# Patient Record
Sex: FEMALE | Race: BLACK OR AFRICAN AMERICAN
[De-identification: names, ages, dates, MRNs, and addresses within clinical notes are randomized per-mention and may not be internally consistent; named-entity substitution may affect disease eponyms.]

---

## 2018-03-20 ENCOUNTER — HOSPITAL ENCOUNTER (INPATIENT)
Dept: HOSPITAL 5 - ED | Age: 63
LOS: 4 days | Discharge: HOME | DRG: 640 | End: 2018-03-24
Attending: INTERNAL MEDICINE | Admitting: INTERNAL MEDICINE
Payer: COMMERCIAL

## 2018-03-20 DIAGNOSIS — E11.9: ICD-10-CM

## 2018-03-20 DIAGNOSIS — I10: ICD-10-CM

## 2018-03-20 DIAGNOSIS — N17.0: ICD-10-CM

## 2018-03-20 DIAGNOSIS — E83.52: Primary | ICD-10-CM

## 2018-03-20 LAB
ALBUMIN SERPL-MCNC: 3.8 G/DL (ref 3.9–5)
ALT SERPL-CCNC: 14 UNITS/L (ref 7–56)
BASOPHILS # (AUTO): 0.1 K/MM3 (ref 0–0.1)
BASOPHILS NFR BLD AUTO: 0.5 % (ref 0–1.8)
BUN SERPL-MCNC: 22 MG/DL (ref 7–17)
BUN SERPL-MCNC: 23 MG/DL (ref 7–17)
BUN/CREAT SERPL: 10 %
BUN/CREAT SERPL: 10 %
CALCIUM SERPL-MCNC: > 13 MG/DL (ref 8.4–10.2)
EOSINOPHIL # BLD AUTO: 0.1 K/MM3 (ref 0–0.4)
EOSINOPHIL NFR BLD AUTO: 1.3 % (ref 0–4.3)
HCT VFR BLD CALC: 35.4 % (ref 30.3–42.9)
HEMOLYSIS INDEX: 21
HEMOLYSIS INDEX: 6
HGB BLD-MCNC: 11.9 GM/DL (ref 10.1–14.3)
LYMPHOCYTES # BLD AUTO: 3.7 K/MM3 (ref 1.2–5.4)
LYMPHOCYTES NFR BLD AUTO: 35.7 % (ref 13.4–35)
MCH RBC QN AUTO: 31 PG (ref 28–32)
MCHC RBC AUTO-ENTMCNC: 34 % (ref 30–34)
MCV RBC AUTO: 92 FL (ref 79–97)
MONOCYTES # (AUTO): 0.8 K/MM3 (ref 0–0.8)
MONOCYTES % (AUTO): 7.9 % (ref 0–7.3)
PLATELET # BLD: 315 K/MM3 (ref 140–440)
RBC # BLD AUTO: 3.83 M/MM3 (ref 3.65–5.03)

## 2018-03-20 PROCEDURE — 96360 HYDRATION IV INFUSION INIT: CPT

## 2018-03-20 PROCEDURE — 36415 COLL VENOUS BLD VENIPUNCTURE: CPT

## 2018-03-20 PROCEDURE — 82310 ASSAY OF CALCIUM: CPT

## 2018-03-20 PROCEDURE — 84166 PROTEIN E-PHORESIS/URINE/CSF: CPT

## 2018-03-20 PROCEDURE — 85027 COMPLETE CBC AUTOMATED: CPT

## 2018-03-20 PROCEDURE — 85025 COMPLETE CBC W/AUTO DIFF WBC: CPT

## 2018-03-20 PROCEDURE — 84165 PROTEIN E-PHORESIS SERUM: CPT

## 2018-03-20 PROCEDURE — 80048 BASIC METABOLIC PNL TOTAL CA: CPT

## 2018-03-20 PROCEDURE — 81001 URINALYSIS AUTO W/SCOPE: CPT

## 2018-03-20 PROCEDURE — 74176 CT ABD & PELVIS W/O CONTRAST: CPT

## 2018-03-20 PROCEDURE — 70450 CT HEAD/BRAIN W/O DYE: CPT

## 2018-03-20 PROCEDURE — 96361 HYDRATE IV INFUSION ADD-ON: CPT

## 2018-03-20 PROCEDURE — 93005 ELECTROCARDIOGRAM TRACING: CPT

## 2018-03-20 PROCEDURE — 82962 GLUCOSE BLOOD TEST: CPT

## 2018-03-20 PROCEDURE — 84484 ASSAY OF TROPONIN QUANT: CPT

## 2018-03-20 PROCEDURE — 80053 COMPREHEN METABOLIC PANEL: CPT

## 2018-03-20 PROCEDURE — 71046 X-RAY EXAM CHEST 2 VIEWS: CPT

## 2018-03-20 PROCEDURE — 93010 ELECTROCARDIOGRAM REPORT: CPT

## 2018-03-20 PROCEDURE — 83970 ASSAY OF PARATHORMONE: CPT

## 2018-03-20 PROCEDURE — A9500 TC99M SESTAMIBI: HCPCS

## 2018-03-20 PROCEDURE — 78070 PARATHYROID PLANAR IMAGING: CPT

## 2018-03-20 PROCEDURE — 77074 RADEX OSSEOUS SURVEY LMTD: CPT

## 2018-03-20 PROCEDURE — 83036 HEMOGLOBIN GLYCOSYLATED A1C: CPT

## 2018-03-20 NOTE — HISTORY AND PHYSICAL REPORT
History of Present Illness


Date of examination: 03/20/18


Date of admission: 


03/20/2018





Chief complaint: 


Chief complaint: Easy fatigability and weakness





History of present illness: 


 History of Present Illness:


62-year-old  female from [facility]--- comes in for feeling weak 

and tired.  Patient has recently moved to [place].  Patient apparently had a 

hairline fracture left shoulder and right lower extremity.  Patient is a poor 

historian.  Has records available from [place] but unable to interpret the 

records because of inadequate documentation and different medication names.


Patient does severe weakness and easy fatigability.  Patient was told that she 

has high calcium while in [facility] but no reason was given.











Past Medical History


Hx Hypertension: Yes


Hx Diabetes: Yes





Social History  


Smoking Status: Never Smoker


Substance Use Type: None





Surgical history


N/a





Family history


Diabetes











Medications and Allergies


 Allergies











Allergy/AdvReac Type Severity Reaction Status Date / Time


 


No Known Allergies Allergy   Verified 03/21/18 00:12











Active Meds: 


Active Medications





Sodium Chloride (Nacl 0.9% 1000 Ml)  1,000 mls @ 250 mls/hr IV ONCE ONE


   Stop: 03/21/18 03:02











Review of Systems


All systems: negative


Constitutional: fatigue, weakness, chronic headaches, poor appetite, no weight 

loss, no weight gain, no fever, no chills, no sweats, no night sweats


Ears, nose, mouth and throat: no hoarseness, no sore throat, no swelling in 

mouth, no swelling in throat


Breasts: deferred


Cardiovascular: no chest pain, no orthopnea, no palpitations, no rapid/

irregular heart beat, no edema, no syncope, no lightheadedness, no shortness of 

breath


Respiratory: no cough, no cough with sputum, no excessive sputum, no hemoptysis

, no shortness of breath, no dyspnea on exertion


Gastrointestinal: nausea, no abdominal pain, no vomiting, no diarrhea, no 

constipation, no change in bowel habits, no hematemesis, no coffee ground emesis


Genitourinary Female: no dysuria, no urinary frequency, no urgency, no stress 

incontinence


Menstruation: ammenorrhea


Rectal: no pain


Musculoskeletal: low back pain, no neck stiffness, no neck pain, no shooting 

arm pain, no arm numbness/tingling, no shooting leg pain, no leg numbness/

tingling, no redness of joints


Integumentary: no rash, no pruritis, no redness, no sores, no wounds, no 

jaundice, no boils, no blisters


Neurological: no head injury, no transient paralysis, no paralysis, no seizures

, no syncope


Psychiatric: no anxiety, no memory loss, no change in sleep habits, no sleep 

disturbances


Endocrine: no cold intolerance, no heat intolerance, no polyphagia, no 

excessive thirst


Hematologic/Lymphatic: no easy bruising, no easy bleeding


Allergic/Immunologic: no urticaria, no allergic rhinitis, no wheezing





Exam





- Physical Exam


Narrative exam: 


Lying in bed comfortably








- Constitutional


Vitals: 


 











Temp Pulse Resp BP Pulse Ox


 


 98.4 F   89   14   142/76   98 


 


 03/20/18 22:59  03/20/18 22:59  03/20/18 22:59  03/20/18 22:59  03/20/18 22:59











General appearance: Present: no acute distress





- EENT


Eyes: Present: PERRL, EOM intact


ENT: hearing intact, clear oral mucosa





- Neck


Neck: Present: supple, normal ROM





- Respiratory


Respiratory effort: normal


Respiratory: bilateral: CTA





- Cardiovascular


Rhythm: regular (70)


Heart Sounds: Present: S1 & S2.  Absent: rub, click





- Extremities


Extremities: no ischemia, pulses intact, pulses symmetrical, No edema


Peripheral Pulses: within normal limits





- Abdominal


General gastrointestinal: Present: soft, non-tender, non-distended, normal 

bowel sounds


Female genitourinary: Present: normal





- Rectal


Rectal Exam: deferred





- Integumentary


Integumentary: Present: clear, warm, dry





- Musculoskeletal


Musculoskeletal: gait normal, strength equal bilaterally





- Psychiatric


Psychiatric: appropriate mood/affect, intact judgment & insight, cooperative





- Neurologic


Neurologic: CNII-XII intact, moves all extremities





- Allied Health


Allied health notes reviewed: nursing, case management





Results





- Labs


CBC & Chem 7: 


 03/20/18 20:03





 03/20/18 22:58


Labs: 


 Laboratory Last Values











WBC  10.3 K/mm3 (4.5-11.0)   03/20/18  20:03    


 


RBC  3.83 M/mm3 (3.65-5.03)   03/20/18  20:03    


 


Hgb  11.9 gm/dl (10.1-14.3)   03/20/18  20:03    


 


Hct  35.4 % (30.3-42.9)   03/20/18  20:03    


 


MCV  92 fl (79-97)   03/20/18  20:03    


 


MCH  31 pg (28-32)   03/20/18  20:03    


 


MCHC  34 % (30-34)   03/20/18  20:03    


 


RDW  12.4 % (13.2-15.2)  L  03/20/18  20:03    


 


Plt Count  315 K/mm3 (140-440)   03/20/18  20:03    


 


Lymph % (Auto)  35.7 % (13.4-35.0)  H  03/20/18  20:03    


 


Mono % (Auto)  7.9 % (0.0-7.3)  H  03/20/18  20:03    


 


Eos % (Auto)  1.3 % (0.0-4.3)   03/20/18  20:03    


 


Baso % (Auto)  0.5 % (0.0-1.8)   03/20/18  20:03    


 


Lymph #  3.7 K/mm3 (1.2-5.4)   03/20/18  20:03    


 


Mono #  0.8 K/mm3 (0.0-0.8)   03/20/18  20:03    


 


Eos #  0.1 K/mm3 (0.0-0.4)   03/20/18  20:03    


 


Baso #  0.1 K/mm3 (0.0-0.1)   03/20/18  20:03    


 


Seg Neutrophils %  54.6 % (40.0-70.0)   03/20/18  20:03    


 


Seg Neutrophils #  5.6 K/mm3 (1.8-7.7)   03/20/18  20:03    


 


Sodium  132 mmol/L (137-145)  L  03/20/18  20:03    


 


Potassium  4.3 mmol/L (3.6-5.0)   03/20/18  20:03    


 


Chloride  93.6 mmol/L ()  L  03/20/18  20:03    


 


Carbon Dioxide  27 mmol/L (22-30)   03/20/18  20:03    


 


Anion Gap  16 mmol/L  03/20/18  20:03    


 


BUN  22 mg/dL (7-17)  H  03/20/18  20:03    


 


Creatinine  2.2 mg/dL (0.7-1.2)  H  03/20/18  20:03    


 


Estimated GFR  23 ml/min  03/20/18  20:03    


 


BUN/Creatinine Ratio  10 %  03/20/18  20:03    


 


Glucose  144 mg/dL ()  H  03/20/18  20:03    


 


Calcium  > 13.0 mg/dL (8.4-10.2)  H*  03/20/18  20:03    


 


Troponin T  0.024 ng/mL (0.00-0.029)   03/20/18  22:10    








 Short CBC











  03/20/18 Range/Units





  20:03 


 


WBC  10.3  (4.5-11.0)  K/mm3


 


Hgb  11.9  (10.1-14.3)  gm/dl


 


Hct  35.4  (30.3-42.9)  %


 


Plt Count  315  (140-440)  K/mm3








 BMP











  03/20/18 03/20/18





  20:03 22:58


 


Sodium  132 L  134 L


 


Potassium  4.3  4.0


 


Chloride  93.6 L  96.7 L


 


Carbon Dioxide  27  22


 


BUN  22 H  23 H


 


Creatinine  2.2 H  2.3 H


 


Glucose  144 H  106 H


 


Calcium  > 13.0 H*  > 13.0 H*








 Cardiac Enzymes











  03/20/18 Range/Units





  22:10 


 


Troponin T  0.024  (0.00-0.029)  ng/mL








 Liver Function











  03/20/18 Range/Units





  22:58 


 


Total Bilirubin  0.20  (0.1-1.2)  mg/dL


 


AST  18  (5-40)  units/L


 


ALT  14  (7-56)  units/L


 


Alkaline Phosphatase  39  ()  units/L


 


Albumin  3.8 L  (3.9-5)  g/dL








 Urine











  03/21/18 Range/Units





  01:14 


 


Urine Color  Straw  (Yellow)  


 


Urine pH  6.0  (5.0-7.0)  


 


Ur Specific Gravity  1.004  (1.003-1.030)  


 


Urine Protein  <15 mg/dl  (Negative)  mg/dL


 


Urine Glucose (UA)  Neg  (Negative)  mg/dL














Assessment and Plan


Advance Directives: Yes (full code)


VTE prophylaxis?: Chemical


Plan of care discussed with patient/family: Yes





- Patient Problems


(1) JOVANA (acute kidney injury)


Current Visit: Yes   Status: Acute   


Plan to address problem: 


Probably secondary to hypercalcemia.  IV fluids for now.








(2) Hypercalcemia


Current Visit: Yes   Status: Acute   


Plan to address problem: 


Differential diagnoses of hyperparathyroidism multiple myeloma and bony 

metastasis.  PTH ordered ,parathyroid scan ordered.  CMP ordered for albumin 

and globulin ratio.  IV fluids for now.  Calcitonin started








(3) Hypertension


Current Visit: Yes   Status: Chronic   


Qualifiers: 


   Hypertension type: essential hypertension   Qualified Code(s): I10 - 

Essential (primary) hypertension   


Plan to address problem: 


Initiated on amlodipine








(4) Type 2 diabetes mellitus


Current Visit: Yes   Status: Chronic   


Qualifiers: 


   Diabetes mellitus long term insulin use: without long term use 


Plan to address problem: 


Oral hypoglycemics and coverage for now.  Check hemoglobin A1c








(5) DVT prophylaxis


Current Visit: Yes   Status: Acute   


Plan to address problem: 


Heparin 5000 every 12

## 2018-03-20 NOTE — EMERGENCY DEPARTMENT REPORT
ED General Adult HPI





- General


Chief complaint: Weakness


Stated complaint: WEAKNESS


Time Seen by Provider: 18 21:54


Source: patient


Mode of arrival: Ambulatory


Limitations: Language Barrier





- History of Present Illness


Initial comments: 





Patient is 62 years old female recently moved from [place] for a visit to her 

family here.  Patient had history of diabetes and hypertension.  Family brought 

the patient because the patient is complaining of generalized weakness and 

fatigue loss of appetite and also been coughing and complaining of headache for 

the last 4 days.  Family said that patient does not have any appetite.  No 

fever no vomiting no diarrhea.  No urinary symptoms.





- Related Data


 Allergies











Allergy/AdvReac Type Severity Reaction Status Date / Time


 


No Known Allergies Allergy   Unverified 18 18:39














ED Review of Systems


ROS: 


Stated complaint: WEAKNESS


Other details as noted in HPI





Comment: All other systems reviewed and negative


Constitutional: denies: chills, fever


ENT: denies: throat pain


Respiratory: cough.  denies: orthopnea, shortness of breath, SOB with exertion, 

SOB at rest


Cardiovascular: denies: chest pain, palpitations, dyspnea on exertion


Gastrointestinal: nausea, constipation.  denies: abdominal pain, vomiting, 

diarrhea, hematemesis, melena, hematochezia


Neurological: headache, weakness.  denies: numbness, paresthesias, confusion





ED Past Medical Hx





- Past Medical History


Hx Hypertension: Yes


Hx Diabetes: Yes





- Social History


Smoking Status: Never Smoker


Substance Use Type: None





ED Physical Exam





- General


Limitations: Language Barrier


General appearance: alert, in no apparent distress





- Head


Head exam: Present: atraumatic, normocephalic





- Eye


Eye exam: Present: normal appearance, PERRL





- ENT


ENT exam: Present: mucous membranes dry





- Neck


Neck exam: Present: normal inspection, full ROM.  Absent: tenderness, 

meningismus, lymphadenopathy, thyromegaly





- Respiratory


Respiratory exam: Present: normal lung sounds bilaterally.  Absent: respiratory 

distress, wheezes, rales, rhonchi, chest wall tenderness, accessory muscle use, 

decreased breath sounds, prolonged expiratory





- Cardiovascular


Cardiovascular Exam: Present: regular rate, normal rhythm, normal heart sounds





- GI/Abdominal


GI/Abdominal exam: Present: soft, normal bowel sounds.  Absent: distended, 

tenderness, guarding, rebound, rigid, organomegaly, mass, bruit, pulsatile mass

, hernia





- Extremities Exam


Extremities exam: Present: normal inspection, full ROM, normal capillary refill





- Back Exam


Back exam: Present: normal inspection, full ROM.  Absent: tenderness, CVA 

tenderness (R), CVA tenderness (L)





- Neurological Exam


Neurological exam: Present: alert, oriented X3, CN II-XII intact, normal gait





- Skin


Skin exam: Present: warm, dry, intact





ED Course


 Vital Signs











  18





  18:33 21:54 22:00


 


Temperature 98.6 F  


 


Pulse Rate 78  


 


Respiratory 16  





Rate   


 


Blood Pressure 157/91  153/81


 


Blood Pressure   





[Left]   


 


O2 Sat by Pulse 100 99 97





Oximetry   














  18





  22:11 22:21 22:51


 


Temperature   


 


Pulse Rate   


 


Respiratory   





Rate   


 


Blood Pressure 153/81 153/81 153/81


 


Blood Pressure   





[Left]   


 


O2 Sat by Pulse 98 97 97





Oximetry   














  18





  22:57 22:59


 


Temperature  98.4 F


 


Pulse Rate  89


 


Respiratory 14 14





Rate  


 


Blood Pressure  


 


Blood Pressure  142/76





[Left]  


 


O2 Sat by Pulse 98 98





Oximetry  














ED Medical Decision Making





- Lab Data


Result diagrams: 


 18 20:03





 18 20:03





- Radiology Data


Radiology results: report reviewed





Referring Physician:   ANABELLE PINO


Patient Name:   SERENA MABRY


Patient ID:   [id number]


YOB: 1955


Sex:   Female


Accession:   [id number]


Report Date:   2018


Report Status:   Finalized


Findings


[place] [address] 





Cat Scan Report 


Signed 





Patient: SERENA MABRY MR#: [id number] 


: 1955 Acct:[id number] 


Age/Sex: 62 / F ADM Date: 18 


Loc: ED 


Attending Dr: 








Ordering Physician: ANABELLE PINO 


Date of Service: 18 


Procedure(s): CT head/brain wo con 


Accession Number(s): [id number] 





cc: ANABELLE PINO 








FINAL REPORT 





EXAM: CT HEAD/BRAIN WO CON 





HISTORY: headache 





TECHNIQUE: CT head without contrast 





PRIORS: None. 





FINDINGS: 


No acute intra-axial or extra-axial hemorrhage is identified. 


There is no evidence of midline shift or mass effect. The 


ventricles and sulci are within normal limits. Gray-white matter 


differentiation is intact. No acute parenchymal abnormalities 


seen. 





Bony calvarium is grossly intact. Visualized portions of the 


mastoids and paranasal sinuses are unremarkable. 





IMPRESSION: 


Negative CT head 











Transcribed By: DEJAH 


Dictated By: RADHA CHAPIN MD 


Electronically Authenticated By: RADHA CHAPIN MD 


Signed Date/Time: 18 











DD/DT: 18 


TD/TT: 18





- Medical Decision Making





I discussed the patient is Dr. Verduzco, he agreed to admit the patient to service.


Critical care attestation.: 


If time is entered above; I have spent that time in minutes in the direct care 

of this critically ill patient, excluding procedure time.








ED Disposition


Clinical Impression: 


 Acute renal failure, Hypercalcemia, Headache





Disposition:  OP ADMIT IP TO THIS HOSP


Is pt being admited?: Yes


Condition: Stable


Referrals: 


PRIMARY CARE,MD [Primary Care Provider] - 3-5 Days

## 2018-03-20 NOTE — CAT SCAN REPORT
FINAL REPORT



EXAM:  CT HEAD/BRAIN WO CON



HISTORY:  headache 



TECHNIQUE:  CT head without contrast 



PRIORS:  None.



FINDINGS:  

No acute intra-axial or extra-axial hemorrhage is identified. 

There is no evidence of midline shift or mass effect.  The

ventricles and sulci are within normal limits. Gray-white matter

differentiation is intact. No acute parenchymal abnormalities

seen. 



Bony calvarium is grossly intact.  Visualized portions of the

mastoids and paranasal sinuses are unremarkable. 



IMPRESSION:  

Negative CT head

## 2018-03-21 LAB
BASOPHILS # (AUTO): 0 K/MM3 (ref 0–0.1)
BASOPHILS NFR BLD AUTO: 0.3 % (ref 0–1.8)
BILIRUB UR QL STRIP: (no result)
BLOOD UR QL VISUAL: (no result)
BUN SERPL-MCNC: 23 MG/DL (ref 7–17)
BUN/CREAT SERPL: 10 %
CALCIUM SERPL-MCNC: > 13 MG/DL (ref 8.4–10.2)
CALCIUM SERPL-MCNC: > 13 MG/DL (ref 8.4–10.2)
EOSINOPHIL # BLD AUTO: 0.2 K/MM3 (ref 0–0.4)
EOSINOPHIL NFR BLD AUTO: 1.8 % (ref 0–4.3)
HCT VFR BLD CALC: 32.7 % (ref 30.3–42.9)
HEMOLYSIS INDEX: 3
HGB BLD-MCNC: 11.2 GM/DL (ref 10.1–14.3)
LYMPHOCYTES # BLD AUTO: 3.6 K/MM3 (ref 1.2–5.4)
LYMPHOCYTES NFR BLD AUTO: 39.6 % (ref 13.4–35)
MCH RBC QN AUTO: 31 PG (ref 28–32)
MCHC RBC AUTO-ENTMCNC: 34 % (ref 30–34)
MCV RBC AUTO: 91 FL (ref 79–97)
MONOCYTES # (AUTO): 0.8 K/MM3 (ref 0–0.8)
MONOCYTES % (AUTO): 8.6 % (ref 0–7.3)
PH UR STRIP: 6 [PH] (ref 5–7)
PLATELET # BLD: 276 K/MM3 (ref 140–440)
PROT UR STRIP-MCNC: (no result) MG/DL
RBC # BLD AUTO: 3.61 M/MM3 (ref 3.65–5.03)
RBC #/AREA URNS HPF: 1 /HPF (ref 0–6)
UROBILINOGEN UR-MCNC: < 2 MG/DL (ref ?–2)
WBC #/AREA URNS HPF: 1 /HPF (ref 0–6)

## 2018-03-21 RX ADMIN — Medication SCH ML: at 12:22

## 2018-03-21 RX ADMIN — AMLODIPINE BESYLATE SCH MG: 10 TABLET ORAL at 12:10

## 2018-03-21 RX ADMIN — Medication PRN ML: at 12:21

## 2018-03-21 RX ADMIN — SODIUM CHLORIDE SCH MLS/HR: 0.9 INJECTION, SOLUTION INTRAVENOUS at 04:05

## 2018-03-21 RX ADMIN — CALCITONIN SALMON SCH UNIT: 200 INJECTION, SOLUTION INTRAMUSCULAR; SUBCUTANEOUS at 21:54

## 2018-03-21 RX ADMIN — HEPARIN SODIUM SCH UNIT: 5000 INJECTION, SOLUTION INTRAVENOUS; SUBCUTANEOUS at 06:32

## 2018-03-21 RX ADMIN — HEPARIN SODIUM SCH UNIT: 5000 INJECTION, SOLUTION INTRAVENOUS; SUBCUTANEOUS at 01:52

## 2018-03-21 RX ADMIN — Medication SCH ML: at 22:00

## 2018-03-21 RX ADMIN — ONDANSETRON PRN MG: 2 INJECTION INTRAMUSCULAR; INTRAVENOUS at 12:47

## 2018-03-21 RX ADMIN — HEPARIN SODIUM SCH UNIT: 5000 INJECTION, SOLUTION INTRAVENOUS; SUBCUTANEOUS at 21:59

## 2018-03-21 RX ADMIN — HEPARIN SODIUM SCH UNIT: 5000 INJECTION, SOLUTION INTRAVENOUS; SUBCUTANEOUS at 13:54

## 2018-03-21 NOTE — PROGRESS NOTE
Assessment and Plan


Assessment and plan: 


62-year-old  female from [facility]-comes in for feeling weak and tired.  

Patient has recently moved to [place].  Patient apparently had a hairline 

fracture left shoulder and right lower extremity.  Patient is a poor historian.

  Has records available from [place] but unable to interpret the records because 

of inadequate documentation and different medication names.


Patient does severe weakness and easy fatigability.  Patient was told that she 

has high calcium while in [place] but no reason was given.





- Patient Problems








(1) JOVANA (acute kidney injury)


Current Visit: Yes   Status: Acute   


Plan to address problem: 


Probably secondary to hypercalcemia.  IV fluids for now.


Nephrology consultation








(2) Hypercalcemia


Current Visit: Yes   Status: Acute   


Plan to address problem: 


Differential diagnoses of hyperparathyroidism multiple myeloma and bony 

metastasis.  PTH ordered ,parathyroid scan ordered.  CMP ordered for albumin 

and globulin ratio.  IV fluids for now.  Calcitonin started


We'll also obtain a skeletal survey for metastatic disease considering recent 

fractures.








(3) Hypertension


Current Visit: Yes   Status: Chronic   


Qualifiers: 


   Hypertension type: essential hypertension   Qualified Code(s): I10 - 

Essential (primary) hypertension   


Plan to address problem: 


Initiated on amlodipine








(4) Type 2 diabetes mellitus


Current Visit: Yes   Status: Chronic   


Qualifiers: 


   Diabetes mellitus long term insulin use: without long term use 


Plan to address problem: 


Oral hypoglycemics and coverage for now.  Check hemoglobin A1c








(5) DVT prophylaxis


Current Visit: Yes   Status: Acute   


Plan to address problem: 


Heparin 5000 every 12


Case discussed with nephrologist and also with patient's family member.


Awaiting medication reconciliation discussed with nursing staff





History


Interval history: 


Patient is seen today for: Hypercalcemia





Seen and examined at bedside; 24hour events reviewed; nursing staff ; no 

adverse overnight events reported to me; Denies any chest pain, nausea, vomiting

, diarrhea


No fever noted blood pressure controlled











Hospitalist Physical





- Physical exam


Narrative exam: 


 VITAL SIGNS:  Reviewed.    


GENERAL:  The patient appeared well nourished and normally developed. Vital 

signs as documented.


HEAD:  No signs of head trauma.


EYES:  Pupils are equal.  Extraocular motions intact.  


EARS:  Hearing grossly intact.


MOUTH:  Oropharynx is normal. 


NECK:  No adenopathy, no JVD.   


CHEST:  Chest with clear breath sounds bilaterally.  No wheezes, rales, or 

rhonchi.  


CARDIAC:  Regular rate and rhythm.  S1 and S2, without murmurs, gallops, or 

rubs.


VASCULAR:  No Edema.  Peripheral pulses normal and equal in all extremities.


ABDOMEN:  Soft, without detectable tenderness.  No sign of distention.  No   

rebound or guarding, and no masses palpated.   Bowel Sounds normal.


MUSCULOSKELETAL:  Good range of motion of all major joints. Extremities without 

clubbing, cyanosis or edema.  


NEUROLOGIC EXAM:  Alert and oriented x 3.  No focal sensory or strength 

deficits.   Speech normal.  Follows commands.


PSYCHIATRIC:  Mood normal.


SKIN:  No rash or lesions.














- Constitutional


Vitals: 


 











Temp Pulse Resp BP Pulse Ox


 


 98.4 F   95 H  16   132/73   96 


 


 03/21/18 20:05  03/21/18 20:05  03/21/18 20:05  03/21/18 20:05  03/21/18 20:05











General appearance: Present: no acute distress





Results





- Labs


CBC & Chem 7: 


 03/22/18 05:32





 03/22/18 05:32


Labs: 


 Laboratory Last Values











WBC  9.1 K/mm3 (4.5-11.0)   03/21/18  06:06    


 


RBC  3.61 M/mm3 (3.65-5.03)  L  03/21/18  06:06    


 


Hgb  11.2 gm/dl (10.1-14.3)   03/21/18  06:06    


 


Hct  32.7 % (30.3-42.9)   03/21/18  06:06    


 


MCV  91 fl (79-97)   03/21/18  06:06    


 


MCH  31 pg (28-32)   03/21/18  06:06    


 


MCHC  34 % (30-34)   03/21/18  06:06    


 


RDW  12.4 % (13.2-15.2)  L  03/21/18  06:06    


 


Plt Count  276 K/mm3 (140-440)   03/21/18  06:06    


 


Lymph % (Auto)  39.6 % (13.4-35.0)  H  03/21/18  06:06    


 


Mono % (Auto)  8.6 % (0.0-7.3)  H  03/21/18  06:06    


 


Eos % (Auto)  1.8 % (0.0-4.3)   03/21/18  06:06    


 


Baso % (Auto)  0.3 % (0.0-1.8)   03/21/18  06:06    


 


Lymph #  3.6 K/mm3 (1.2-5.4)   03/21/18  06:06    


 


Mono #  0.8 K/mm3 (0.0-0.8)   03/21/18  06:06    


 


Eos #  0.2 K/mm3 (0.0-0.4)   03/21/18  06:06    


 


Baso #  0.0 K/mm3 (0.0-0.1)   03/21/18  06:06    


 


Seg Neutrophils %  49.7 % (40.0-70.0)   03/21/18  06:06    


 


Seg Neutrophils #  4.5 K/mm3 (1.8-7.7)   03/21/18  06:06    


 


Sodium  138 mmol/L (137-145)   03/21/18  06:06    


 


Potassium  3.8 mmol/L (3.6-5.0)   03/21/18  06:06    


 


Chloride  100.1 mmol/L ()   03/21/18  06:06    


 


Carbon Dioxide  26 mmol/L (22-30)   03/21/18  06:06    


 


Anion Gap  16 mmol/L  03/21/18  06:06    


 


BUN  23 mg/dL (7-17)  H  03/21/18  06:06    


 


Creatinine  2.3 mg/dL (0.7-1.2)  H  03/21/18  06:06    


 


Estimated GFR  22 ml/min  03/21/18  06:06    


 


BUN/Creatinine Ratio  10 %  03/21/18  06:06    


 


Glucose  56 mg/dL ()  L  03/21/18  06:06    


 


POC Glucose  174  ()  H  03/21/18  21:55    


 


Hemoglobin A1c  8.1 % (4-6)  H  03/21/18  00:27    


 


Calcium  > 13.0 mg/dL (8.4-10.2)  H*  03/21/18  06:06    


 


Total Bilirubin  0.20 mg/dL (0.1-1.2)   03/20/18  22:58    


 


AST  18 units/L (5-40)   03/20/18  22:58    


 


ALT  14 units/L (7-56)   03/20/18  22:58    


 


Alkaline Phosphatase  39 units/L ()   03/20/18  22:58    


 


Troponin T  0.024 ng/mL (0.00-0.029)   03/20/18  22:10    


 


Total Protein  6.9 g/dL (6.3-8.2)   03/20/18  22:58    


 


Albumin  3.8 g/dL (3.9-5)  L  03/20/18  22:58    


 


Albumin/Globulin Ratio  1.2 %  03/20/18  22:58    


 


PTH Intact  10.97 pg/mL (15-65)  L  03/21/18  00:27    


 


Urine Color  Straw  (Yellow)   03/21/18  01:14    


 


Urine Turbidity  Clear  (Clear)   03/21/18  01:14    


 


Urine pH  6.0  (5.0-7.0)   03/21/18  01:14    


 


Ur Specific Gravity  1.004  (1.003-1.030)   03/21/18  01:14    


 


Urine Protein  <15 mg/dl mg/dL (Negative)   03/21/18  01:14    


 


Urine Glucose (UA)  Neg mg/dL (Negative)   03/21/18  01:14    


 


Urine Ketones  Neg mg/dL (Negative)   03/21/18  01:14    


 


Urine Blood  Neg  (Negative)   03/21/18  01:14    


 


Urine Nitrite  Neg  (Negative)   03/21/18  01:14    


 


Urine Bilirubin  Neg  (Negative)   03/21/18  01:14    


 


Urine Urobilinogen  < 2.0 mg/dL (<2.0)   03/21/18  01:14    


 


Ur Leukocyte Esterase  Neg  (Negative)   03/21/18  01:14    


 


Urine WBC (Auto)  1.0 /HPF (0.0-6.0)   03/21/18  01:14    


 


Urine RBC (Auto)  1.0 /HPF (0.0-6.0)   03/21/18  01:14

## 2018-03-21 NOTE — NUCLEAR MEDICINE REPORT
NUCLEAR MEDICINE PARATHYROID SCAN:



History: Hypercalcemia.



FINDINGS:

The initial scintigraphic images of the thyroid bed demonstrate

normal and symmetric uptake of the radiotracer in the thyroid bed. 

Normal salivary and mediastinal activity is identified.



The delayed images demonstrate normal washout of the radiotracer

from the thyroid bed.  No persistent activity is identified to suggest 

a parathyroid adenoma.



IMPRESSION:

Normal parathyroid scan.

## 2018-03-21 NOTE — CONSULTATION
History of Present Illness





- Reason for Consult


Consult date: 03/21/18


acute renal failure


Requesting physician: RAMÓN DEE





- History of Present Illness


This is a 62 years old [place]ag[place] woman, recently moved from [place] for a visit to 

her family here, with past medical history of hypertension, type 2 DM, and 

recent hospitalization in [place] due to fracture of left shoulder and R lower 

extremity, now brought in to ER by family with complaints of generalized 

weakness and fatigue loss of appetite and also been coughing and complaining of 

headache for the last 4 days. pt denies fever, chills, CP, SOB, palpitations, 

abd pain, blurry vision, dysuria. Labs showed significantly increased Ca at >13 

along with elevated BUN/Cr at 23/2.3mg/dl for which renal consult is requested. 

pt brings in records from [place], Cr was 1.1mg/dl in 2/11/18. no known 

malignancy noted. pt's daughter reports that she has been taking calcium 

supplementations after her fracture was diagnosed. 





Past History


Past Medical History: diabetes, hypertension


Past Surgical History: No surgical history


Social history: lives with family.  denies: smoking, alcohol abuse, 

prescription drug abuse, IV drug use


Family history: hypertension





Medications and Allergies


 Allergies











Allergy/AdvReac Type Severity Reaction Status Date / Time


 


No Known Allergies Allergy   Verified 03/21/18 00:12











Active Meds: 


Active Medications





Acetaminophen (Tylenol)  650 mg PO Q4H PRN


   PRN Reason: Pain MILD(1-3)/Fever >100.5/HA


Amlodipine Besylate (Norvasc)  10 mg PO QDAY [facility]


Calcitonin Burnett (Miacalcin)  250 unit 4 unit/kg (250 unit) IM Q12HR [facility]


   Stop: 03/23/18 10:01


Famotidine (Pepcid)  20 mg IV DAILY [facility]


Heparin Sodium (Porcine) (Heparin)  5,000 unit SUB-Q Q8HR [facility]


   Last Admin: 03/21/18 06:32 Dose:  5,000 unit


Hydromorphone HCl (Dilaudid)  0.5 mg IV Q3H PRN


   PRN Reason: Pain , Severe (7-10)


Sodium Chloride (Nacl 0.9% 1000 Ml)  1,000 mls @ 100 mls/hr IV AS DIRECT [facility]


   Last Admin: 03/21/18 04:05 Dose:  100 mls/hr


Pamidronate Disodium 60 mg/ (Sodium Chloride)  1,006.6667 mls @ 100 mls/hr IV 

ONCE ONE


   Stop: 03/21/18 17:03


Insulin Human Lispro (Humalog)  0 unit SUB-Q ACHS [facility]; Protocol


   Last Admin: 03/21/18 08:22 Dose:  Not Given


Morphine Sulfate (Morphine)  2 mg IV Q4H PRN


   PRN Reason: Pain, Moderate (4-6)


Ondansetron HCl (Zofran)  4 mg IV Q8H PRN


   PRN Reason: Nausea And Vomiting


Oxycodone/Acetaminophen (Percocet 5/325)  1 tab PO Q6H PRN


   PRN Reason: Pain, Moderate (4-6)


Sodium Chloride (Sodium Chloride Flush Syringe 10 Ml)  10 ml IV BID [facility]


Sodium Chloride (Sodium Chloride Flush Syringe 10 Ml)  10 ml IV PRN PRN


   PRN Reason: LINE FLUSH











Review of Systems


All systems: negative


Constitutional: anorexia, fatigue, weakness, lethargy





Exam





- Vital Signs


Vital signs: 


 Vital Signs











Temp Pulse Resp BP Pulse Ox


 


 98.6 F   78   16   157/91   100 


 


 03/20/18 18:33  03/20/18 18:33  03/20/18 18:33  03/20/18 18:33  03/20/18 18:33














- General Appearance


General appearance: well-developed, well-nourished, appears stated age


EENT: ATNC, PERRL, mucous membranes moist


Neck: Present: neck supple


Respiratory: Clear to Ascultation


Heart: regular, S1S2


Gastrointestinal: Present: normoactive bowel sounds


Integumentary: no rash, other (no edema )


Neurologic: no focal deficit, alert and oriented x3, strength 5/5, CN 3-12 

intact


Psychiatric: mood/affect appropriate, cooperative





Results





- Lab Results





 03/21/18 06:06





 03/21/18 06:06


 Most recent lab results











Calcium  > 13.0 mg/dL (8.4-10.2)  H*  03/21/18  06:06    














Assessment and Plan





- Patient Problems


(1) Hypercalcemia


Current Visit: Yes   Status: Acute   


Plan to address problem: 


hypercalcemia in the setting of increased calcium supplementation, 

dehydaration. PTH adequately suppressed. to rule out underlying malignancy. 

check SPEP w/ SAEID/ UPEP, check  PTHrP. cont IV NS, start pamidronate (slow 

infusion in 2-3hrs given Jovana), will add calcitonin 4mcg/kg q12hr for 4 doses. 








(2) JOVANA (acute kidney injury)


Current Visit: Yes   Status: Acute   


Plan to address problem: 


due to acute hypercalcemia. treatment as above. 








(3) Hypertension


Current Visit: Yes   Status: Chronic   


Qualifiers: 


   Hypertension type: essential hypertension   Qualified Code(s): I10 - 

Essential (primary) hypertension   


Plan to address problem: 


Bp controlled 








(4) Type 2 diabetes mellitus


Current Visit: Yes   Status: Chronic   


Qualifiers: 


   Diabetes mellitus long term insulin use: without long term use 


Plan to address problem: 


glucose control as per primary attending

## 2018-03-22 LAB
ALBUMIN SERPL-MCNC: 3.7 G/DL (ref 3.9–5)
ALT SERPL-CCNC: 13 UNITS/L (ref 7–56)
BUN SERPL-MCNC: 22 MG/DL (ref 7–17)
BUN/CREAT SERPL: 11 %
CALCIUM SERPL-MCNC: 12.5 MG/DL (ref 8.4–10.2)
HCT VFR BLD CALC: 29.8 % (ref 30.3–42.9)
HEMOLYSIS INDEX: 3
HGB BLD-MCNC: 10.4 GM/DL (ref 10.1–14.3)
MCH RBC QN AUTO: 32 PG (ref 28–32)
MCHC RBC AUTO-ENTMCNC: 35 % (ref 30–34)
MCV RBC AUTO: 90 FL (ref 79–97)
PLATELET # BLD: 270 K/MM3 (ref 140–440)
RBC # BLD AUTO: 3.3 M/MM3 (ref 3.65–5.03)

## 2018-03-22 RX ADMIN — Medication SCH ML: at 21:42

## 2018-03-22 RX ADMIN — SODIUM CHLORIDE SCH MLS/HR: 0.9 INJECTION, SOLUTION INTRAVENOUS at 13:35

## 2018-03-22 RX ADMIN — HEPARIN SODIUM SCH UNIT: 5000 INJECTION, SOLUTION INTRAVENOUS; SUBCUTANEOUS at 21:41

## 2018-03-22 RX ADMIN — HEPARIN SODIUM SCH UNIT: 5000 INJECTION, SOLUTION INTRAVENOUS; SUBCUTANEOUS at 13:17

## 2018-03-22 RX ADMIN — FAMOTIDINE SCH MG: 20 TABLET ORAL at 10:47

## 2018-03-22 RX ADMIN — SODIUM CHLORIDE SCH MLS/HR: 0.9 INJECTION, SOLUTION INTRAVENOUS at 23:15

## 2018-03-22 RX ADMIN — AMLODIPINE BESYLATE SCH MG: 10 TABLET ORAL at 10:47

## 2018-03-22 RX ADMIN — ONDANSETRON PRN MG: 2 INJECTION INTRAMUSCULAR; INTRAVENOUS at 17:14

## 2018-03-22 RX ADMIN — Medication PRN ML: at 23:16

## 2018-03-22 RX ADMIN — Medication SCH: at 13:19

## 2018-03-22 RX ADMIN — HEPARIN SODIUM SCH UNIT: 5000 INJECTION, SOLUTION INTRAVENOUS; SUBCUTANEOUS at 06:14

## 2018-03-22 RX ADMIN — ONDANSETRON PRN MG: 2 INJECTION INTRAMUSCULAR; INTRAVENOUS at 23:15

## 2018-03-22 RX ADMIN — CALCITONIN SALMON SCH UNIT: 200 INJECTION, SOLUTION INTRAMUSCULAR; SUBCUTANEOUS at 11:50

## 2018-03-22 RX ADMIN — CALCITONIN SALMON SCH: 200 INJECTION, SOLUTION INTRAMUSCULAR; SUBCUTANEOUS at 23:19

## 2018-03-22 NOTE — CAT SCAN REPORT
FINAL REPORT



EXAM:  CT ABDOMEN PELVIS WO CON



HISTORY:  nausea 



TECHNIQUE:  CT abdomen and pelvis without contrast 



PRIORS:  None.



FINDINGS:  

No acute abnormality identified in the lung bases. 



No focal abnormality identified within the liver parenchyma.  The

spleen demonstrates normal size and attenuation. 



No pancreatic abnormalities seen. 



Kidneys demonstrate no evidence of hydronephrosis or

nephrolithiasis. No ureteral calculus identified. 



The adrenal glands are unremarkable. 



Abdominal aorta is normal in caliber. 



No pathologically enlarged lymph nodes are identified. No signs

of free fluid or free air 



No evidence of small bowel dilatation. 



The appendix is identified and is normal in size no adjacent

inflammatory change seen. Urinary bladder is unremarkable. 



IMPRESSION:  

Negative. No acute abnormalities seen

## 2018-03-22 NOTE — PROGRESS NOTE
Assessment and Plan





- Patient Problems


(1) Hypercalcemia


Current Visit: Yes   Status: Acute   


Plan to address problem: 


hypercalcemia in the setting of increased calcium supplementation, 

dehydaration. PTH adequately suppressed. to rule out underlying malignancy. 

pending SPEP w/ SAEID/ UPEP, check  PTHrP. cont IV NS, s/p pamidronate, cont 

calcitonin 4mcg/kg q12hr for 4 doses. 








(2) JOVANA (acute kidney injury)


Current Visit: Yes   Status: Acute   


Plan to address problem: 


due to acute hypercalcemia. treatment as above. 








(3) Hypertension


Current Visit: Yes   Status: Chronic   


Qualifiers: 


   Hypertension type: essential hypertension   Qualified Code(s): I10 - 

Essential (primary) hypertension   


Plan to address problem: 


Bp controlled 








(4) Type 2 diabetes mellitus


Current Visit: Yes   Status: Chronic   


Qualifiers: 


   Diabetes mellitus long term insulin use: without long term use 


Plan to address problem: 


glucose control as per primary attending 








Subjective


Date of service: 03/22/18


Principal diagnosis: hypercalcemia 


Interval history: 





Pt awake, alert, in NAD 





Objective





- Vital Signs


Vital signs: 


 Vital Signs - 12hr











  03/22/18 03/22/18 03/22/18





  06:13 07:54 07:55


 


Temperature 99.0 F 99.0 F 99.0 F


 


Pulse Rate 96 H 89 90


 


Respiratory 18 22 22





Rate   


 


Blood Pressure 121/54 124/66 


 


O2 Sat by Pulse 91 98 97





Oximetry   














  03/22/18 03/22/18 03/22/18





  12:12 12:13 15:11


 


Temperature 98.5 F 98.5 F 98.7 F


 


Pulse Rate 94 H 92 H 97 H


 


Respiratory 20 20 20





Rate   


 


Blood Pressure 117/59  136/69


 


O2 Sat by Pulse 97 97 93





Oximetry   














- General Appearance


General appearance: well-developed, well-nourished, appears stated age


EENT: ATNC, PERRL, mucous membranes moist


Neck: no JVD


Respiratory: Present: Clear to Ascultation


Cardiology: regular, S1S2


Gastrointestinal: normoactive bowel sounds


Integumentary: no rash, other (no edema )


Neurologic: no focal deficit, alert and oriented x3, strength 5/5, CN 3-12 

intact


Psychiatric: mood/affect appropriate, cooperative





- Lab





 03/22/18 05:32





 03/22/18 05:32


 Most recent lab results











Calcium  12.6 mg/dL (8.4-10.2)  H*  03/22/18  08:29

## 2018-03-22 NOTE — XRAY REPORT
METASTATIC BONE SURVEY



History: cancer, left shoulder pain, right lower extremity pain.



Findings: Multiple radiographs of the axial and proximal appendicular 

skeleton were obtained. Mild osteopenia is suspected. No lytic or 

blastic bony lesions are detected to suggest a metastatic process. 

Moderate osteoarthritic changes are noted throughout the spine, 

shoulders and knees.



A healing left humeral neck fracture is identified. Alignment is near 

anatomic and callus bridges the fracture site although fracture lines 

remain evident. No obvious underlying bone lesion is appreciated on 

x-ray. This could be further evaluated with MRI with contrast if needed.



IMPRESSION:

Osteopenia.

Degenerative changes.

Healing left humeral neck fracture.

## 2018-03-22 NOTE — PROGRESS NOTE
Assessment and Plan


Assessment and plan: 


62-year-old  female from [facility]-comes in for feeling weak and tired.  

Patient has recently moved to [place].  Patient apparently had a hairline 

fracture left shoulder and right lower extremity.  Patient is a poor historian.

  Has records available from [place] but unable to interpret the records because 

of inadequate documentation and different medication names.


Patient does severe weakness and easy fatigability.  Patient was told that she 

has high calcium while in [place] but no reason was given.





- Patient Problems








(1) JOVANA (acute kidney injury)


Current Visit: Yes   Status: Acute   


Plan to address problem: 


Probably secondary to hypercalcemia.  IV fluids for now.


Nephrology consultation INPUT NOTED hypercalcemia in the setting of increased 

calcium supplementation, dehydaration. PTH adequately suppressed. to rule out 

underlying malignancy. pending SPEP w/ SAEID/ UPEP, check  PTHrP. cont IV NS, s/p 

pamidronate, cont calcitonin 4mcg/kg q12hr for 4 doses.








(2) Hypercalcemia


Current Visit: Yes   Status: Acute   


Plan to address problem: 


Differential diagnoses of hyperparathyroidism multiple myeloma and bony 

metastasis.  PTH ordered ,parathyroid scan ordered.  CMP ordered for albumin 

and globulin ratio.  IV fluids for now.  Calcitonin started


We'll also obtain a skeletal survey for metastatic disease considering recent 

fractures.








(3) Hypertension


Current Visit: Yes   Status: Chronic   


Qualifiers: 


   Hypertension type: essential hypertension   Qualified Code(s): I10 - 

Essential (primary) hypertension   


Plan to address problem: 


Initiated on amlodipine








(4) Type 2 diabetes mellitus


Current Visit: Yes   Status: Chronic   


Qualifiers: 


   Diabetes mellitus long term insulin use: without long term use 


Plan to address problem: 


Oral hypoglycemics and coverage for now.  Check hemoglobin A1c








(5) DVT prophylaxis


Current Visit: Yes   Status: Acute   


Plan to address problem: 


Heparin 5000 every 12


Case discussed with nephrologist and also with patient's family member.


Awaiting medication reconciliation discussed with nursing staff





History


Interval history: 


Patient is seen today for: Hypercalcemia





Seen and examined at bedside; 24hour events reviewed; nursing staff ; no 

adverse overnight events reported to me; Denies any chest pain, nausea, vomiting

, diarrhea


No fever noted blood pressure controlled











Hospitalist Physical





- Physical exam


Narrative exam: 


 VITAL SIGNS:  Reviewed.    


GENERAL:  The patient appeared well nourished and normally developed. Vital 

signs as documented.


HEAD:  No signs of head trauma.


EYES:  Pupils are equal.  Extraocular motions intact.  


EARS:  Hearing grossly intact.


MOUTH:  Oropharynx is normal. 


NECK:  No adenopathy, no JVD.   


CHEST:  Chest with clear breath sounds bilaterally.  No wheezes, rales, or 

rhonchi.  


CARDIAC:  Regular rate and rhythm.  S1 and S2, without murmurs, gallops, or 

rubs.


VASCULAR:  No Edema.  Peripheral pulses normal and equal in all extremities.


ABDOMEN:  Soft, without detectable tenderness.  No sign of distention.  No   

rebound or guarding, and no masses palpated.   Bowel Sounds normal.


MUSCULOSKELETAL:  Good range of motion of all major joints. Extremities without 

clubbing, cyanosis or edema.  


NEUROLOGIC EXAM:  Alert and oriented x 3.  No focal sensory or strength 

deficits.   Speech normal.  Follows commands.


PSYCHIATRIC:  Mood normal.


SKIN:  No rash or lesions.














- Constitutional


Vitals: 


 











Temp Pulse Resp BP Pulse Ox


 


 98.7 F   97 H  20   136/69   93 


 


 03/22/18 15:11  03/22/18 15:11  03/22/18 15:11  03/22/18 15:11  03/22/18 15:11











General appearance: Present: no acute distress





Results





- Labs


CBC & Chem 7: 


 03/22/18 05:32





 03/22/18 05:32


Labs: 


 Laboratory Last Values











WBC  8.9 K/mm3 (4.5-11.0)   03/22/18  05:32    


 


RBC  3.30 M/mm3 (3.65-5.03)  L  03/22/18  05:32    


 


Hgb  10.4 gm/dl (10.1-14.3)   03/22/18  05:32    


 


Hct  29.8 % (30.3-42.9)  L  03/22/18  05:32    


 


MCV  90 fl (79-97)   03/22/18  05:32    


 


MCH  32 pg (28-32)   03/22/18  05:32    


 


MCHC  35 % (30-34)  H  03/22/18  05:32    


 


RDW  12.5 % (13.2-15.2)  L  03/22/18  05:32    


 


Plt Count  270 K/mm3 (140-440)   03/22/18  05:32    


 


Lymph % (Auto)  39.6 % (13.4-35.0)  H  03/21/18  06:06    


 


Mono % (Auto)  8.6 % (0.0-7.3)  H  03/21/18  06:06    


 


Eos % (Auto)  1.8 % (0.0-4.3)   03/21/18  06:06    


 


Baso % (Auto)  0.3 % (0.0-1.8)   03/21/18  06:06    


 


Lymph #  3.6 K/mm3 (1.2-5.4)   03/21/18  06:06    


 


Mono #  0.8 K/mm3 (0.0-0.8)   03/21/18  06:06    


 


Eos #  0.2 K/mm3 (0.0-0.4)   03/21/18  06:06    


 


Baso #  0.0 K/mm3 (0.0-0.1)   03/21/18  06:06    


 


Seg Neutrophils %  49.7 % (40.0-70.0)   03/21/18  06:06    


 


Seg Neutrophils #  4.5 K/mm3 (1.8-7.7)   03/21/18  06:06    


 


Sodium  136 mmol/L (137-145)  L  03/22/18  05:32    


 


Potassium  4.2 mmol/L (3.6-5.0)   03/22/18  05:32    


 


Chloride  99.9 mmol/L ()   03/22/18  05:32    


 


Carbon Dioxide  24 mmol/L (22-30)   03/22/18  05:32    


 


Anion Gap  16 mmol/L  03/22/18  05:32    


 


BUN  22 mg/dL (7-17)  H  03/22/18  05:32    


 


Creatinine  2.0 mg/dL (0.7-1.2)  H  03/22/18  05:32    


 


Estimated GFR  25 ml/min  03/22/18  05:32    


 


BUN/Creatinine Ratio  11 %  03/22/18  05:32    


 


Glucose  166 mg/dL ()  H  03/22/18  05:32    


 


POC Glucose  228  ()  H  03/22/18  16:08    


 


Hemoglobin A1c  8.1 % (4-6)  H  03/21/18  00:27    


 


Calcium  12.6 mg/dL (8.4-10.2)  H*  03/22/18  08:29    


 


Total Bilirubin  0.20 mg/dL (0.1-1.2)   03/22/18  05:32    


 


AST  14 units/L (5-40)   03/22/18  05:32    


 


ALT  13 units/L (7-56)   03/22/18  05:32    


 


Alkaline Phosphatase  37 units/L ()   03/22/18  05:32    


 


Troponin T  0.024 ng/mL (0.00-0.029)   03/20/18  22:10    


 


Total Protein  6.7 g/dL (6.3-8.2)   03/22/18  05:32    


 


Albumin  3.7 g/dL (3.9-5)  L  03/22/18  05:32    


 


Albumin/Globulin Ratio  1.2 %  03/22/18  05:32    


 


PTH Intact  10.97 pg/mL (15-65)  L  03/21/18  00:27    


 


Urine Color  Straw  (Yellow)   03/21/18  01:14    


 


Urine Turbidity  Clear  (Clear)   03/21/18  01:14    


 


Urine pH  6.0  (5.0-7.0)   03/21/18  01:14    


 


Ur Specific Gravity  1.004  (1.003-1.030)   03/21/18  01:14    


 


Urine Protein  <15 mg/dl mg/dL (Negative)   03/21/18  01:14    


 


Urine Glucose (UA)  Neg mg/dL (Negative)   03/21/18  01:14    


 


Urine Ketones  Neg mg/dL (Negative)   03/21/18  01:14    


 


Urine Blood  Neg  (Negative)   03/21/18  01:14    


 


Urine Nitrite  Neg  (Negative)   03/21/18  01:14    


 


Urine Bilirubin  Neg  (Negative)   03/21/18  01:14    


 


Urine Urobilinogen  < 2.0 mg/dL (<2.0)   03/21/18  01:14    


 


Ur Leukocyte Esterase  Neg  (Negative)   03/21/18  01:14    


 


Urine WBC (Auto)  1.0 /HPF (0.0-6.0)   03/21/18  01:14    


 


Urine RBC (Auto)  1.0 /HPF (0.0-6.0)   03/21/18  01:14

## 2018-03-23 LAB
ALBUMIN SERPL-MCNC: 3.6 G/DL (ref 3.9–5)
ALT SERPL-CCNC: 12 UNITS/L (ref 7–56)
BUN SERPL-MCNC: 19 MG/DL (ref 7–17)
BUN/CREAT SERPL: 9 %
CALCIUM SERPL-MCNC: 10.7 MG/DL (ref 8.4–10.2)
HCT VFR BLD CALC: 29.1 % (ref 30.3–42.9)
HEMOLYSIS INDEX: 6
HGB BLD-MCNC: 9.9 GM/DL (ref 10.1–14.3)
MCH RBC QN AUTO: 31 PG (ref 28–32)
MCHC RBC AUTO-ENTMCNC: 34 % (ref 30–34)
MCV RBC AUTO: 91 FL (ref 79–97)
PLATELET # BLD: 286 K/MM3 (ref 140–440)
RBC # BLD AUTO: 3.2 M/MM3 (ref 3.65–5.03)

## 2018-03-23 RX ADMIN — HEPARIN SODIUM SCH UNIT: 5000 INJECTION, SOLUTION INTRAVENOUS; SUBCUTANEOUS at 05:58

## 2018-03-23 RX ADMIN — HEPARIN SODIUM SCH UNIT: 5000 INJECTION, SOLUTION INTRAVENOUS; SUBCUTANEOUS at 14:23

## 2018-03-23 RX ADMIN — Medication SCH ML: at 23:11

## 2018-03-23 RX ADMIN — SODIUM CHLORIDE SCH MLS/HR: 0.9 INJECTION, SOLUTION INTRAVENOUS at 09:03

## 2018-03-23 RX ADMIN — FAMOTIDINE SCH MG: 20 TABLET ORAL at 09:08

## 2018-03-23 RX ADMIN — AMLODIPINE BESYLATE SCH MG: 10 TABLET ORAL at 09:08

## 2018-03-23 RX ADMIN — Medication SCH: at 12:29

## 2018-03-23 RX ADMIN — CALCITONIN SALMON SCH UNIT: 200 INJECTION, SOLUTION INTRAMUSCULAR; SUBCUTANEOUS at 12:24

## 2018-03-23 RX ADMIN — SODIUM CHLORIDE SCH MLS/HR: 0.9 INJECTION, SOLUTION INTRAVENOUS at 20:34

## 2018-03-23 RX ADMIN — HEPARIN SODIUM SCH UNIT: 5000 INJECTION, SOLUTION INTRAVENOUS; SUBCUTANEOUS at 22:10

## 2018-03-23 NOTE — PROGRESS NOTE
Assessment and Plan





- Patient Problems


(1) Hypercalcemia


Current Visit: Yes   Status: Acute   


Plan to address problem: 


hypercalcemia in the setting of increased calcium supplementation, 

dehydaration. PTH adequately suppressed. to rule out underlying malignancy. 

pending SPEP w/ SAEID/ UPEP, check  PTHrP. Ca improving s/p pamidronate, on IV NS 

and calcitonin 4mcg/kg q12hr for 4 doses.  








(2) JOVANA (acute kidney injury)


Current Visit: Yes   Status: Acute   


Plan to address problem: 


due to acute hypercalcemia. treatment as above. 








(3) Hypertension


Current Visit: Yes   Status: Chronic   


Qualifiers: 


   Hypertension type: essential hypertension   Qualified Code(s): I10 - 

Essential (primary) hypertension   


Plan to address problem: 


Bp controlled 








(4) Type 2 diabetes mellitus


Current Visit: Yes   Status: Chronic   


Qualifiers: 


   Diabetes mellitus long term insulin use: without long term use 


Plan to address problem: 


glucose control as per primary attending 








Subjective


Date of service: 03/23/18


Principal diagnosis: hypercalcemia 


Interval history: 





Pt awake, alert, in NAD 





Objective





- Vital Signs


Vital signs: 


 Vital Signs - 12hr











  03/23/18 03/23/18 03/23/18





  04:32 04:52 07:47


 


Temperature  97.7 F 98.3 F


 


Pulse Rate 91 H  91 H


 


Respiratory  18 14





Rate   


 


Blood Pressure 139/60  134/63


 


O2 Sat by Pulse 99  98





Oximetry   














  03/23/18





  11:37


 


Temperature 97.8 F


 


Pulse Rate 87


 


Respiratory 18





Rate 


 


Blood Pressure 130/57


 


O2 Sat by Pulse 94





Oximetry 














- General Appearance


General appearance: well-developed, well-nourished, appears stated age


EENT: ATNC, PERRL, mucous membranes moist


Neck: no JVD


Respiratory: Present: Clear to Ascultation


Cardiology: regular, S1S2


Gastrointestinal: normoactive bowel sounds


Integumentary: no rash, other (no edema )


Neurologic: no focal deficit, alert and oriented x3, strength 5/5, CN 3-12 

intact


Psychiatric: mood/affect appropriate, cooperative





- Lab





 03/23/18 06:15





 03/23/18 06:15


 Most recent lab results











Calcium  10.7 mg/dL (8.4-10.2)  H D 03/23/18  06:15

## 2018-03-23 NOTE — PROGRESS NOTE
Assessment and Plan


Assessment and plan: 


62-year-old  female from [facility]-comes in for feeling weak and tired.  

Patient has recently moved to [place].  Patient apparently had a hairline 

fracture left shoulder and right lower extremity.  Patient is a poor historian.

  Has records available from [place] but unable to interpret the records because 

of inadequate documentation and different medication names.


Patient does severe weakness and easy fatigability.  Patient was told that she 

has high calcium while in [place] but no reason was given.





- Patient Problems








(1) JOVANA (acute kidney injury)


Current Visit: Yes   Status: Acute   


Plan to address problem: 


Probably secondary to hypercalcemia.  IV fluids for now.


Nephrology consultation INPUT NOTED hypercalcemia in the setting of increased 

calcium supplementation, dehydaration. PTH adequately suppressed. to rule out 

underlying malignancy. pending SPEP w/ SAEID/ UPEP, check  PTHrP. cont IV NS, s/p 

pamidronate, cont calcitonin 4mcg/kg q12hr for 4 doses.








(2) Hypercalcemia


Current Visit: Yes   Status: Acute   


Plan to address problem: 


Differential diagnoses of hyperparathyroidism multiple myeloma and bony 

metastasis.  PTH ordered ,parathyroid scan ordered.  CMP ordered for albumin 

and globulin ratio.  IV fluids for now.  Calcitonin started


We'll also obtain a skeletal survey for metastatic disease considering recent 

fractures.negative








(3) Hypertension


Current Visit: Yes   Status: Chronic   


Qualifiers: 


   Hypertension type: essential hypertension   Qualified Code(s): I10 - 

Essential (primary) hypertension   


Plan to address problem: 


Initiated on amlodipine








(4) Type 2 diabetes mellitus


Current Visit: Yes   Status: Chronic   


Qualifiers: 


   Diabetes mellitus long term insulin use: without long term use 


Plan to address problem: 


Oral hypoglycemics and coverage for now.  Check hemoglobin A1c


adjust insulin due to elevated blood glucose








(5) DVT prophylaxis


Current Visit: Yes   Status: Acute   


Plan to address problem: 


Heparin 5000 every 12


Case discussed with nephrologist and also with patient's family member.


Awaiting medication reconciliation discussed with nursing staff


Discharge in AM





History


Interval history: 


Patient is seen today for: Hypercalcemia





Seen and examined at bedside; 24hour events reviewed; nursing staff ; no 

adverse overnight events reported to me; Denies any chest pain, nausea, vomiting

, diarrhea


No fever noted blood pressure controlled











Hospitalist Physical





- Physical exam


Narrative exam: 


 VITAL SIGNS:  Reviewed.    


GENERAL:  The patient appeared well nourished and normally developed. Vital 

signs as documented.


HEAD:  No signs of head trauma.


EYES:  Pupils are equal.  Extraocular motions intact.  


EARS:  Hearing grossly intact.


MOUTH:  Oropharynx is normal. 


NECK:  No adenopathy, no JVD.   


CHEST:  Chest with clear breath sounds bilaterally.  No wheezes, rales, or 

rhonchi.  


CARDIAC:  Regular rate and rhythm.  S1 and S2, without murmurs, gallops, or 

rubs.


VASCULAR:  No Edema.  Peripheral pulses normal and equal in all extremities.


ABDOMEN:  Soft, without detectable tenderness.  No sign of distention.  No   

rebound or guarding, and no masses palpated.   Bowel Sounds normal.


MUSCULOSKELETAL:  Good range of motion of all major joints. Extremities without 

clubbing, cyanosis or edema.  


NEUROLOGIC EXAM:  Alert and oriented x 3.  No focal sensory or strength 

deficits.   Speech normal.  Follows commands.


PSYCHIATRIC:  Mood normal.


SKIN:  No rash or lesions.














- Constitutional


Vitals: 


 











Temp Pulse Resp BP Pulse Ox


 


 97.8 F   96 H  16   134/69   99 


 


 03/23/18 15:47  03/23/18 15:47  03/23/18 15:47  03/23/18 15:47  03/23/18 15:47











General appearance: Present: no acute distress





Results





- Labs


CBC & Chem 7: 


 03/23/18 06:15





 03/23/18 06:15


Labs: 


 Laboratory Last Values











WBC  8.2 K/mm3 (4.5-11.0)   03/23/18  06:15    


 


RBC  3.20 M/mm3 (3.65-5.03)  L  03/23/18  06:15    


 


Hgb  9.9 gm/dl (10.1-14.3)  L  03/23/18  06:15    


 


Hct  29.1 % (30.3-42.9)  L  03/23/18  06:15    


 


MCV  91 fl (79-97)   03/23/18  06:15    


 


MCH  31 pg (28-32)   03/23/18  06:15    


 


MCHC  34 % (30-34)   03/23/18  06:15    


 


RDW  12.4 % (13.2-15.2)  L  03/23/18  06:15    


 


Plt Count  286 K/mm3 (140-440)   03/23/18  06:15    


 


Lymph % (Auto)  39.6 % (13.4-35.0)  H  03/21/18  06:06    


 


Mono % (Auto)  8.6 % (0.0-7.3)  H  03/21/18  06:06    


 


Eos % (Auto)  1.8 % (0.0-4.3)   03/21/18  06:06    


 


Baso % (Auto)  0.3 % (0.0-1.8)   03/21/18  06:06    


 


Lymph #  3.6 K/mm3 (1.2-5.4)   03/21/18  06:06    


 


Mono #  0.8 K/mm3 (0.0-0.8)   03/21/18  06:06    


 


Eos #  0.2 K/mm3 (0.0-0.4)   03/21/18  06:06    


 


Baso #  0.0 K/mm3 (0.0-0.1)   03/21/18  06:06    


 


Seg Neutrophils %  49.7 % (40.0-70.0)   03/21/18  06:06    


 


Seg Neutrophils #  4.5 K/mm3 (1.8-7.7)   03/21/18  06:06    


 


Sodium  140 mmol/L (137-145)   03/23/18  06:15    


 


Potassium  3.8 mmol/L (3.6-5.0)   03/23/18  06:15    


 


Chloride  104.0 mmol/L ()   03/23/18  06:15    


 


Carbon Dioxide  21 mmol/L (22-30)  L  03/23/18  06:15    


 


Anion Gap  19 mmol/L  03/23/18  06:15    


 


BUN  19 mg/dL (7-17)  H  03/23/18  06:15    


 


Creatinine  2.1 mg/dL (0.7-1.2)  H  03/23/18  06:15    


 


Estimated GFR  24 ml/min  03/23/18  06:15    


 


BUN/Creatinine Ratio  9 %  03/23/18  06:15    


 


Glucose  93 mg/dL ()   03/23/18  06:15    


 


POC Glucose  312  ()  H  03/23/18  16:12    


 


Hemoglobin A1c  8.1 % (4-6)  H  03/21/18  00:27    


 


Calcium  10.7 mg/dL (8.4-10.2)  H D 03/23/18  06:15    


 


Total Bilirubin  0.20 mg/dL (0.1-1.2)   03/23/18  06:15    


 


AST  15 units/L (5-40)   03/23/18  06:15    


 


ALT  12 units/L (7-56)   03/23/18  06:15    


 


Alkaline Phosphatase  35 units/L ()   03/23/18  06:15    


 


Troponin T  0.024 ng/mL (0.00-0.029)   03/20/18  22:10    


 


Total Protein  6.5 g/dL (6.3-8.2)   03/23/18  06:15    


 


Albumin  3.6 g/dL (3.9-5)  L  03/23/18  06:15    


 


Albumin/Globulin Ratio  1.2 %  03/23/18  06:15    


 


PTH Intact  10.97 pg/mL (15-65)  L  03/21/18  00:27    


 


Urine Color  Straw  (Yellow)   03/21/18  01:14    


 


Urine Turbidity  Clear  (Clear)   03/21/18  01:14    


 


Urine pH  6.0  (5.0-7.0)   03/21/18  01:14    


 


Ur Specific Gravity  1.004  (1.003-1.030)   03/21/18  01:14    


 


Urine Protein  <15 mg/dl mg/dL (Negative)   03/21/18  01:14    


 


Urine Glucose (UA)  Neg mg/dL (Negative)   03/21/18  01:14    


 


Urine Ketones  Neg mg/dL (Negative)   03/21/18  01:14    


 


Urine Blood  Neg  (Negative)   03/21/18  01:14    


 


Urine Nitrite  Neg  (Negative)   03/21/18  01:14    


 


Urine Bilirubin  Neg  (Negative)   03/21/18  01:14    


 


Urine Urobilinogen  < 2.0 mg/dL (<2.0)   03/21/18  01:14    


 


Ur Leukocyte Esterase  Neg  (Negative)   03/21/18  01:14    


 


Urine WBC (Auto)  1.0 /HPF (0.0-6.0)   03/21/18  01:14    


 


Urine RBC (Auto)  1.0 /HPF (0.0-6.0)   03/21/18  01:14

## 2018-03-24 VITALS — DIASTOLIC BLOOD PRESSURE: 78 MMHG | SYSTOLIC BLOOD PRESSURE: 139 MMHG

## 2018-03-24 LAB
BUN SERPL-MCNC: 15 MG/DL (ref 7–17)
BUN/CREAT SERPL: 9 %
CALCIUM SERPL-MCNC: 9.3 MG/DL (ref 8.4–10.2)
HEMOLYSIS INDEX: 12

## 2018-03-24 RX ADMIN — HEPARIN SODIUM SCH UNIT: 5000 INJECTION, SOLUTION INTRAVENOUS; SUBCUTANEOUS at 05:16

## 2018-03-24 RX ADMIN — FAMOTIDINE SCH MG: 20 TABLET ORAL at 09:57

## 2018-03-24 RX ADMIN — Medication SCH ML: at 09:56

## 2018-03-24 RX ADMIN — AMLODIPINE BESYLATE SCH MG: 10 TABLET ORAL at 10:05

## 2018-03-24 NOTE — DISCHARGE SUMMARY
Providers





- Providers


Date of Admission: 


03/20/18 23:46





Attending physician: 


RAMÓN DEE MD





 





03/21/18 07:02


Consult to Physician [CONS] Routine 


   Comment: 


   Consulting Provider: GINO GOTTI


   Physician Instructions: 


   Reason For Exam: SEVERE HYPERCALCEMIA, JOVANA











Primary care physician: 


PRIMARY CARE MD








Hospitalization


Reason for admission: Hypercalcemia


Condition: Stable


Hospital course: 


62-year-old [place] female from [facility]-comes in for feeling weak and tired.  

Patient has recently moved to [place].  Patient apparently had a hairline 

fracture left shoulder and right lower extremity.  Patient is a poor historian.

  Has records available from [place] but unable to interpret the records because 

of inadequate documentation and different medication names.


Patient does severe weakness and easy fatigability.  Patient was told that she 

has high calcium while in [facility] but no reason was given.  Patient was started 

on hydration therapy received a dose of pamidronate and also was started on 4 

doses of calcitonin 4 mcg/kg every 24 hours.  The patient's symptoms were 

resolving.  Calcium did come down to around 7.1.  Skeletal survey for 

malignancy was done as patient had a history of fractures this did not show any 

occult malignancy.  Nevertheless I did discuss with the patient and family 

about the need to follow up with the primary care physician which we provided a 

name to do Dr. Cobos for evaluation for age appropriate cancer screening.  

Patient is also to follow with nephrologist outpatient.





Discharge diagnosis





(1) JOVANA (acute kidney injury) secondary to ATN


(2) Hypercalcemia


(3) Hypertension


(4) Type 2 diabetes mellitus


(5) old healed fracture left shoulder





Disposition: DC-01 TO HOME OR SELFCARE


Time spent for discharge: 35 mins





Core Measure Documentation





- Palliative Care


Palliative Care/ Comfort Measures: Not Applicable





- Core Measures


Any of the following diagnoses?: none





- VTE Discharge Requirements


Deep Vein Thrombosis/Pulmonary Embolism Present on Admission: No





Exam





- Physical Exam


Narrative exam: 


 VITAL SIGNS:  Reviewed.    


GENERAL:  The patient appeared well nourished and normally developed. Vital 

signs as documented.


HEAD:  No signs of head trauma.


EYES:  Pupils are equal.  Extraocular motions intact.  


EARS:  Hearing grossly intact.


MOUTH:  Oropharynx is normal. 


NECK:  No adenopathy, no JVD.   


CHEST:  Chest with clear breath sounds bilaterally.  No wheezes, rales, or 

rhonchi.  


CARDIAC:  Regular rate and rhythm.  S1 and S2, without murmurs, gallops, or 

rubs.


VASCULAR:  No Edema.  Peripheral pulses normal and equal in all extremities.


ABDOMEN:  Soft, without detectable tenderness.  No sign of distention.  No   

rebound or guarding, and no masses palpated.   Bowel Sounds normal.


MUSCULOSKELETAL:  Good range of motion of all major joints. Extremities without 

clubbing, cyanosis or edema.  


NEUROLOGIC EXAM:  Alert and oriented x 3.  No focal sensory or strength 

deficits.   Speech normal.  Follows commands.


PSYCHIATRIC:  Mood normal.


SKIN:  No rash or lesions.














- Constitutional


Vitals: 


 











Temp Pulse Resp BP Pulse Ox


 


 98.4 F   88   22   139/68   97 


 


 03/24/18 08:12  03/24/18 10:05  03/24/18 08:12  03/24/18 10:05  03/24/18 08:12














Plan


Activity: advance as tolerated, fall precautions


Additional Instructions: follow up with PCP for age appropriate cancer 

screening.


Follow up with: 


TUTU STALEY MD [Staff Physician] - 3 Days


GINO GOTTI MD [Staff Physician] - 7 Days


Prescriptions: 


amLODIPine [Norvasc] 10 mg PO QDAY #30 tablet


Famotidine [Pepcid] 20 mg PO DAILY #30 tablet


Insulin NPH/Regular [Novolin 70/30] 15 unit SQ BIDDIAB #1 ml

## 2018-03-25 LAB
ALBUMIN SERPL-MCNC: 3.5 G/DL (ref 3.8–4.8)
GAMMA GLOB SERPL ELPH-MCNC: 1.1 G/DL (ref 0.8–1.7)